# Patient Record
Sex: FEMALE | Race: WHITE | NOT HISPANIC OR LATINO | Employment: UNEMPLOYED | URBAN - METROPOLITAN AREA
[De-identification: names, ages, dates, MRNs, and addresses within clinical notes are randomized per-mention and may not be internally consistent; named-entity substitution may affect disease eponyms.]

---

## 2023-07-19 ENCOUNTER — OFFICE VISIT (OUTPATIENT)
Dept: PRIMARY CARE | Facility: CLINIC | Age: 63
End: 2023-07-19
Payer: COMMERCIAL

## 2023-07-19 VITALS
DIASTOLIC BLOOD PRESSURE: 76 MMHG | OXYGEN SATURATION: 96 % | RESPIRATION RATE: 18 BRPM | TEMPERATURE: 97.9 F | BODY MASS INDEX: 24.8 KG/M2 | HEIGHT: 68 IN | WEIGHT: 163.6 LBS | SYSTOLIC BLOOD PRESSURE: 108 MMHG | HEART RATE: 68 BPM

## 2023-07-19 DIAGNOSIS — M25.442 FINGER JOINT SWELLING, LEFT: Primary | ICD-10-CM

## 2023-07-19 PROCEDURE — 99213 OFFICE O/P EST LOW 20 MIN: CPT | Performed by: FAMILY MEDICINE

## 2023-07-19 PROCEDURE — 1036F TOBACCO NON-USER: CPT | Performed by: FAMILY MEDICINE

## 2023-07-19 RX ORDER — CEPHALEXIN 500 MG/1
500 CAPSULE ORAL 2 TIMES DAILY
Qty: 10 CAPSULE | Refills: 0 | Status: SHIPPED | OUTPATIENT
Start: 2023-07-19 | End: 2023-07-24

## 2023-07-19 NOTE — ASSESSMENT & PLAN NOTE
Mild swelling, suspect more inflammatory condition likely related to overuse  Recommend use of ibuprofen regularly q6hrs for 24-48 hours, can ice or cold soaks  If any worsening erythema then antibiotic sent so pt has available while on vacation  If swelling worsens, progresses, any change in sensation then recommend reevaluation

## 2023-07-19 NOTE — PROGRESS NOTES
"Subjective   Patient ID: Morena Calle is a 63 y.o. female who presents for INFECTED FINGER (LEFT INDEX FINGER, A LITTLE SWOLLEN AND PAINFUL, AND TIGHT TO MOVE. PREV SMALL OPEN WOUND/).    HPI   Noticed some redness and swelling in the second digit at the PIP joint.  I has improved somewhat.  She has been doing a lot with her hands and fingers.  Took some ibuprofen and monitored it.  Seems to have improved.  Only concern is that she is going out of town tomorrow. Minimal discomfort with ROM, no significant pain, numbness.    Review of Systems  Negative unless noted in HPI   Objective   /76 (BP Location: Left arm, Patient Position: Sitting)   Pulse 68   Temp 36.6 °C (97.9 °F) (Oral)   Resp 18   Ht 1.727 m (5' 7.99\")   Wt 74.2 kg (163 lb 9.6 oz)   SpO2 96%   BMI 24.88 kg/m²     Physical Exam  Constitutional:       Appearance: Normal appearance.   Musculoskeletal:      Comments: Left hand, 2nd digit is minimally edematous compared to right, the palmar surfaces there is no significant erythema, no open lesions or cuts noted, normal cap refill, no fluctuance or induration   Neurological:      Mental Status: She is alert.         Assessment/Plan   Problem List Items Addressed This Visit       Finger joint swelling, left - Primary     Mild swelling, suspect more inflammatory condition likely related to overuse  Recommend use of ibuprofen regularly q6hrs for 24-48 hours, can ice or cold soaks  If any worsening erythema then antibiotic sent so pt has available while on vacation  If swelling worsens, progresses, any change in sensation then recommend reevaluation         Relevant Medications    cephalexin (Keflex) 500 mg capsule          "

## 2023-11-06 ENCOUNTER — ANCILLARY PROCEDURE (OUTPATIENT)
Dept: RADIOLOGY | Facility: CLINIC | Age: 63
End: 2023-11-06
Payer: COMMERCIAL

## 2023-11-06 DIAGNOSIS — Z12.31 ENCOUNTER FOR SCREENING MAMMOGRAM FOR MALIGNANT NEOPLASM OF BREAST: ICD-10-CM

## 2023-11-06 PROCEDURE — 77067 SCR MAMMO BI INCL CAD: CPT

## 2023-11-06 PROCEDURE — 77063 BREAST TOMOSYNTHESIS BI: CPT | Performed by: RADIOLOGY

## 2023-11-06 PROCEDURE — 77067 SCR MAMMO BI INCL CAD: CPT | Performed by: RADIOLOGY

## 2023-11-14 ENCOUNTER — OFFICE VISIT (OUTPATIENT)
Dept: PRIMARY CARE | Facility: CLINIC | Age: 63
End: 2023-11-14
Payer: COMMERCIAL

## 2023-11-14 VITALS
OXYGEN SATURATION: 97 % | WEIGHT: 161 LBS | DIASTOLIC BLOOD PRESSURE: 80 MMHG | HEIGHT: 67 IN | HEART RATE: 71 BPM | SYSTOLIC BLOOD PRESSURE: 128 MMHG | BODY MASS INDEX: 25.27 KG/M2

## 2023-11-14 DIAGNOSIS — R82.90 ABNORMAL URINE ODOR: ICD-10-CM

## 2023-11-14 DIAGNOSIS — Z23 FLU VACCINE NEED: ICD-10-CM

## 2023-11-14 DIAGNOSIS — Z00.00 HEALTH MAINTENANCE EXAMINATION: Primary | ICD-10-CM

## 2023-11-14 DIAGNOSIS — R15.9 INCONTINENCE OF FECES, UNSPECIFIED FECAL INCONTINENCE TYPE: ICD-10-CM

## 2023-11-14 LAB
APPEARANCE UR: CLEAR
BILIRUB UR STRIP.AUTO-MCNC: NEGATIVE MG/DL
COLOR UR: YELLOW
GLUCOSE UR STRIP.AUTO-MCNC: NEGATIVE MG/DL
KETONES UR STRIP.AUTO-MCNC: NEGATIVE MG/DL
LEUKOCYTE ESTERASE UR QL STRIP.AUTO: NEGATIVE
NITRITE UR QL STRIP.AUTO: NEGATIVE
PH UR STRIP.AUTO: 7 [PH]
PROT UR STRIP.AUTO-MCNC: NEGATIVE MG/DL
RBC # UR STRIP.AUTO: NEGATIVE /UL
SP GR UR STRIP.AUTO: 1.01
UROBILINOGEN UR STRIP.AUTO-MCNC: <2 MG/DL

## 2023-11-14 PROCEDURE — 90686 IIV4 VACC NO PRSV 0.5 ML IM: CPT | Performed by: FAMILY MEDICINE

## 2023-11-14 PROCEDURE — 99396 PREV VISIT EST AGE 40-64: CPT | Performed by: FAMILY MEDICINE

## 2023-11-14 PROCEDURE — 90471 IMMUNIZATION ADMIN: CPT | Performed by: FAMILY MEDICINE

## 2023-11-14 PROCEDURE — 1036F TOBACCO NON-USER: CPT | Performed by: FAMILY MEDICINE

## 2023-11-14 PROCEDURE — 81003 URINALYSIS AUTO W/O SCOPE: CPT

## 2023-11-14 ASSESSMENT — PAIN SCALES - GENERAL: PAINLEVEL: 0-NO PAIN

## 2023-11-14 NOTE — ASSESSMENT & PLAN NOTE
Discussed potential causes  Recommend trial of physical therapy as this may be the cause of the symptoms, referral placed

## 2023-11-14 NOTE — ASSESSMENT & PLAN NOTE
Unclear cause  Will do UA with microscopy to evaluate further  Can consider urology referral if no obvious cause noted

## 2023-11-14 NOTE — PROGRESS NOTES
Reason for Visit: Annual Physical Exam    HPI:  Presents for wellness  Sees GYN for pap/breast, up to date  Sees GI and up to date with colonoscopy  Did have labs at 's work and TSH was a bit off, would like to recheck  Deals with GI issues, if she does not take metamucil gets some anal leakage and rash, GI did not find any problems, Metamucil does help  Has had abnormal smell to urine for last 2-3 years, no obvious cause, has had testing done for STI and negative, no other cause, wonders if related to GI issues    Active Problem List  Patient Active Problem List   Diagnosis    Finger joint swelling, left    Health maintenance examination    Abnormal urine odor    Incontinence of feces       Comprehensive Medical/Surgical/Social/Family History  Past Medical History:   Diagnosis Date    Diverticulitis of intestine, part unspecified, without perforation or abscess without bleeding 10/16/2014    Diverticulitis    Pain in unspecified ankle and joints of unspecified foot 05/07/2014    Ankle joint pain    Personal history of other diseases of the respiratory system 06/16/2014    History of acute sinusitis    Personal history of other endocrine, nutritional and metabolic disease     History of hypothyroidism    Unspecified abnormal findings in urine 06/01/2021    Abnormal urine odor     Past Surgical History:   Procedure Laterality Date    OTHER SURGICAL HISTORY  02/13/2014    Colposcopy Cervix With Biopsy(S) With Endocervical Curettage     Social History     Tobacco Use    Smoking status: Former     Types: Cigarettes    Smokeless tobacco: Never   Substance Use Topics    Alcohol use: Yes    Drug use: Never     Family History   Problem Relation Name Age of Onset    Breast cancer Paternal Grandmother  80         Allergies and Medications  Adhesive  Current Outpatient Medications on File Prior to Visit   Medication Sig Dispense Refill    psyllium (Metamucil) 3.4 gram packet Take by mouth.       No current  "facility-administered medications on file prior to visit.         ROS otherwise negative aside from what was mentioned above in HPI.    Vitals  /80   Pulse 71   Ht 1.702 m (5' 7\")   Wt 73 kg (161 lb)   SpO2 97%   BMI 25.22 kg/m²   Body mass index is 25.22 kg/m².  Physical Exam  Gen: Alert, NAD  HEENT:  PERRL, EOMI, conjunctiva and sclera normal in appearance.   Neck:  Supple with FROM; No masses/nodes palpable; Thyroid nontender and without nodules; No JERAD  Respiratory:  Lungs CTAB  Cardiovascular:  Heart RRR. No M/R/G. Peripheral pulses equal bilaterally  Abdomen:  Soft, nontender,  No R/G/R; No HSM or masses palpated  Extremities:  FROM all extremities; Muscle strength grossly normal with good tone  Neuro:  CN II-XII intact; Gross motor and sensory intact  Skin:  No suspicious lesions present    Assessment and Plan:  Problem List Items Addressed This Visit       Health maintenance examination - Primary    Current Assessment & Plan     Recommended screening guidelines addressed and orders placed as indicated by age and chronic conditions  Screening labs ordered, will call with results  Continue to work on healthy lifestyle including well balanced diet, regular activity, limit alcohol, no tobacco products and safe sexual practices  Follow up annually           Relevant Orders    Comprehensive Metabolic Panel    Lipid Panel    TSH with reflex to Free T4 if abnormal    Abnormal urine odor    Current Assessment & Plan     Unclear cause  Will do UA with microscopy to evaluate further  Can consider urology referral if no obvious cause noted         Relevant Orders    Urinalysis with Reflex Microscopic    Incontinence of feces    Current Assessment & Plan     Discussed potential causes  Recommend trial of physical therapy as this may be the cause of the symptoms, referral placed         Relevant Orders    Referral to Physical Therapy     Other Visit Diagnoses       Flu vaccine need        Relevant Orders    Flu " vaccine (IIV4) age 6 months and greater, preservative free (Completed)              Ana Gonzales MD

## 2024-01-19 ENCOUNTER — APPOINTMENT (OUTPATIENT)
Dept: OBSTETRICS AND GYNECOLOGY | Facility: CLINIC | Age: 64
End: 2024-01-19
Payer: COMMERCIAL

## 2024-01-25 ENCOUNTER — TREATMENT (OUTPATIENT)
Dept: PHYSICAL THERAPY | Facility: CLINIC | Age: 64
End: 2024-01-25
Payer: COMMERCIAL

## 2024-01-25 DIAGNOSIS — R15.0 INCOMPLETE DEFECATION: Primary | ICD-10-CM

## 2024-01-25 DIAGNOSIS — N39.3 STRESS INCONTINENCE: ICD-10-CM

## 2024-01-25 PROCEDURE — 97535 SELF CARE MNGMENT TRAINING: CPT | Mod: GP | Performed by: PHYSICAL THERAPIST

## 2024-01-25 PROCEDURE — 97161 PT EVAL LOW COMPLEX 20 MIN: CPT | Mod: GP | Performed by: PHYSICAL THERAPIST

## 2024-01-25 ASSESSMENT — ENCOUNTER SYMPTOMS
LOSS OF SENSATION IN FEET: 0
OCCASIONAL FEELINGS OF UNSTEADINESS: 0
DEPRESSION: 0

## 2024-01-25 NOTE — PROGRESS NOTES
Physical Therapy    PELVIC FLOOR EVALUATION AND TREATMENT    Name: Morena Calle  MRN: [id number]  : 1960  Today's Date: 24     Time Calculation  Start Time: 800  Stop Time: 905  Time Calculation (min): 65 min  Visit: 1    Assessment:   Pt presents to clinic with chief complaint of fecal smearing/incomplete emptying and rare stress incontinence. Pt states symptoms have been ongoing for the last couple of years and have gotten progressively worse.   Provided pt with flexibility exercises as well as reviewed and instructed pt on proper voiding technique and breath work to help with complete emptying   Explained to pt that this PT is not trained to perform specific rectal examination; provided pt with other appropriate providers who are trained to assess this specific area. Will have pt follow up with one of these PFPTs within the  system         Plan:   Treatment/Interventions: Cryotherapy, Education/ Instruction, Electrical stimulation, Hot pack, Manual therapy, Neuromuscular re-education, Self care/ home management, Therapeutic activities, Therapeutic exercises  PT Plan: Skilled PT  PT Frequency: 1 time per week  Duration: 6-8 weeks  Rehab Potential: Good  Plan of Care Agreement: Patient      Current Problem:  1. Incomplete defecation        2. Stress incontinence            Subjective   General  Reason for Referral: Incomplete emptying, fecal smearing, stress UI  Referred By: Dr. Ana Gonzales  Preferred Learning Style: visual, written  Precautions  STEADI Fall Risk Score (The score of 4 or more indicates an increased risk of falling): 0  Precautions Comment: None    Objective   PELVIC HISTORY:    Chief Complaint/Description of Symptoms:   Pt presents to clinic with chief complaint of incomplete emptying, fecal smearing after having BM; potential leakage       HPI:   Pt states problem has been ongoing for the last couple of years  Unsure if she is actually having leakage or has difficulty with  full emptying; pt states she will have a BM, wipe until clean and then when she returns to go void (to urinate), when she wipes she will still note smearing when she wipes   BM tend to be inconsistent in frequency and consistency as well; the last couple of weeks have been good, however, typically she has looser stools     Home Environment/Social Factors/Occupation:     Vaginal births with both of her children   No significant tearing or difficulty healing after births    PELVIC PAIN:     Pt reports intermittent lower abdominal/stomach pain; pt does feel like it could be related to IBS    Since onset, the symptoms are: worsening; some days are better than others   Pain when emptying bladder: No  Pain with wiping or tight clothing: irritation vs pain   Pain with intercourse: Not specifically   History of back pain: No    EXERCISE:  Do you do Kegels? No   Current exercise regime: NA    BLADDER:     Excessive Urinary Urgency: No  Daytime Voiding Frequency: WNL  Nighttime Voiding Frequency: Occasionally will wake up in the middle of the nigh to void   Unintentional urine loss frequency: Rarely  Leakage occurs with: sneezing, coughing  Leakage amount: Small   Difficulty initiating flow of urine: No  Slow/weak urine stream: No  Difficulty starting urine stream/push to urinate: No  Able to completely empty bladder: Yes  Tests performed by doctor: No  Frequent UTI's: No  Frequent yeast infections: No     BOWEL:     Excessive Bowel Urgency: No  BM Frequency: Daily  Frequent Diarrhea: Yes  Frequent constipation/straining/incomplete emptying: Not specifically   Lyman Stool Scale rating: Type 4 lately; previous to that was more Type 5-6    Unintentional stool loss: Don't necessarily see anything; will note smearing with wiping and irritation  Stool loss frequency: Daily; incomplete emptying (?)  FI occurs with what activity: N/A  FI amount: Small amount; if any   Consistency of stool when FI occurs: not specific; again  will more notice smearing vs full leakage    Tests performed by doctor: No specific testing besides regular colonoscopies       POSTURE:   Posture WNL   Rounded shoulder posturing  Mild winging R scapula       ROM R/L:  Lumbar flex: 90  Lumbar ext: 10  Lumbar side bend: 25/25 no pain  Lumbar rotation: 100%/100%  Hip flex: WNL/WNL  Hip IR: Mild limitation/mild limitation  Hip ER: WNL/WNL    STRENGTH  R/L     Hip flexors: 5/5   Hip abd: 5/5   Hip add: 5/5   Hip ER: 4+/4+   Hip IR: 5/5     Quadriceps: 5/5   Hamstrings: 4+/4+      OUTCOMES MEASURE:  Female NIH Chronic Prostatitis Symptom Index: 15    Education:  PFPT  Anatomy of PF   Potential causes of current symptoms    TREATMENT  Therapeutic exercise:  Happy baby  Child's pose with pelvic floor relaxation    Self-care  Colonic massage review and instruction  Correct voiding posturing review and instruction along with breathwork to help with full emptying     Careplan Goals:  Problem: PT Problem       Goal: Pt will be independent in HEP for home maintenance            Pradip De La Cruz, PT

## 2024-02-22 ENCOUNTER — OFFICE VISIT (OUTPATIENT)
Dept: OBSTETRICS AND GYNECOLOGY | Facility: CLINIC | Age: 64
End: 2024-02-22
Payer: COMMERCIAL

## 2024-02-22 VITALS — WEIGHT: 162 LBS | HEIGHT: 67 IN | BODY MASS INDEX: 25.43 KG/M2

## 2024-02-22 DIAGNOSIS — Z01.419 WELL WOMAN EXAM: Primary | ICD-10-CM

## 2024-02-22 DIAGNOSIS — Z12.4 CERVICAL CANCER SCREENING: ICD-10-CM

## 2024-02-22 PROCEDURE — 1036F TOBACCO NON-USER: CPT | Performed by: OBSTETRICS & GYNECOLOGY

## 2024-02-22 PROCEDURE — 88175 CYTOPATH C/V AUTO FLUID REDO: CPT

## 2024-02-22 PROCEDURE — 87624 HPV HI-RISK TYP POOLED RSLT: CPT

## 2024-02-22 PROCEDURE — 99396 PREV VISIT EST AGE 40-64: CPT | Performed by: OBSTETRICS & GYNECOLOGY

## 2024-02-22 ASSESSMENT — ENCOUNTER SYMPTOMS
FEVER: 0
CONSTIPATION: 0
VOMITING: 0
BACK PAIN: 0
DYSURIA: 0
ABDOMINAL DISTENTION: 0
SHORTNESS OF BREATH: 0
FATIGUE: 0
COLOR CHANGE: 0
SLEEP DISTURBANCE: 0
FREQUENCY: 0
UNEXPECTED WEIGHT CHANGE: 0
ABDOMINAL PAIN: 0
DIARRHEA: 0
BLOOD IN STOOL: 0
FLANK PAIN: 0
CHILLS: 0
APPETITE CHANGE: 0
HEMATURIA: 0
NAUSEA: 0

## 2024-02-22 ASSESSMENT — PAIN SCALES - GENERAL: PAINLEVEL: 0-NO PAIN

## 2024-02-22 NOTE — PROGRESS NOTES
"History Of Present Illness  Routine Gyn Exam  Morena Calle here for routine WWE.  Pt is postmenopausal.  Denies spotting or bleeding.     Concerns: none.   Pt has long history of issues with bowel movements and has some urinary complaints. Has complained of urinary odor for several years with no findings on routine testing.  She has appointment with Urogyn next month to follow up on these issues.     New health issues or surgeries since last visit: denies.  Exercise: none.     Gynecologic History  Postmenopausal.  Sexually active: Yes with one male partner/ .  Last Pap: .   Last mammogram: .   Last Colonoscopy:  up to date per patient .       Obstetric History  OB History    Para Term  AB Living   4 2 2   2 2   SAB IAB Ectopic Multiple Live Births           1      # Outcome Date GA Lbr Jef/2nd Weight Sex Delivery Anes PTL Lv   4 Term     M Vag-Spont   CATHY   3 Term     F Vag-Spont      2 AB            1 AB                 Review of Systems   Constitutional:  Negative for appetite change, chills, fatigue, fever and unexpected weight change.   Respiratory:  Negative for shortness of breath.    Cardiovascular:  Negative for chest pain.   Gastrointestinal:  Negative for abdominal distention, abdominal pain, blood in stool, constipation, diarrhea, nausea and vomiting.   Endocrine: Negative for cold intolerance and heat intolerance.   Genitourinary:  Negative for dyspareunia, dysuria, flank pain, frequency, genital sores, hematuria, menstrual problem, pelvic pain, urgency, vaginal bleeding, vaginal discharge and vaginal pain.   Musculoskeletal:  Negative for back pain.   Skin:  Negative for color change.   Psychiatric/Behavioral:  Negative for sleep disturbance.        Ht 1.702 m (5' 7\")   Wt 73.5 kg (162 lb)   BMI 25.37 kg/m²      Physical Exam  Constitutional:       Appearance: Normal appearance.   HENT:      Head: Normocephalic and atraumatic.   Chest:   Breasts:     " Right: Normal.      Left: Normal.   Abdominal:      General: Abdomen is flat.      Palpations: Abdomen is soft.      Tenderness: There is no abdominal tenderness.   Genitourinary:     General: Normal vulva.      Vagina: Normal.      Cervix: Normal.      Uterus: Normal.       Adnexa: Right adnexa normal and left adnexa normal.      Comments: Pap collected today    Skin:     General: Skin is warm and dry.   Neurological:      Mental Status: She is alert and oriented to person, place, and time.   Psychiatric:         Mood and Affect: Mood normal.              Assessment/Plan         Routine Well Woman Exam Today  Discussed diet and exercise.   Reviewed routine health screenings.   Pap: pap done today   Recommend annual mammograms. Last mammogram 11/2023.  Currently up to date on colon cancer screening.     Pt to follow up with Urogyn for some pelvic floor/urinary issues.                Jaylin Acosta MD

## 2024-03-01 ENCOUNTER — OFFICE VISIT (OUTPATIENT)
Dept: OBSTETRICS AND GYNECOLOGY | Facility: CLINIC | Age: 64
End: 2024-03-01
Payer: COMMERCIAL

## 2024-03-01 VITALS
DIASTOLIC BLOOD PRESSURE: 77 MMHG | BODY MASS INDEX: 24.71 KG/M2 | SYSTOLIC BLOOD PRESSURE: 133 MMHG | HEIGHT: 68 IN | HEART RATE: 79 BPM | WEIGHT: 163 LBS

## 2024-03-01 DIAGNOSIS — R82.90 ABNORMAL URINE ODOR: Primary | ICD-10-CM

## 2024-03-01 DIAGNOSIS — R15.9 INCONTINENCE OF FECES, UNSPECIFIED FECAL INCONTINENCE TYPE: ICD-10-CM

## 2024-03-01 DIAGNOSIS — N95.2 VAGINAL ATROPHY: ICD-10-CM

## 2024-03-01 DIAGNOSIS — K64.9 HEMORRHOIDS, UNSPECIFIED HEMORRHOID TYPE: ICD-10-CM

## 2024-03-01 LAB
POC APPEARANCE, URINE: CLEAR
POC BILIRUBIN, URINE: NEGATIVE
POC BLOOD, URINE: NEGATIVE
POC COLOR, URINE: YELLOW
POC GLUCOSE, URINE: NEGATIVE MG/DL
POC KETONES, URINE: NEGATIVE MG/DL
POC LEUKOCYTES, URINE: NEGATIVE
POC NITRITE,URINE: NEGATIVE
POC PH, URINE: 6 PH
POC PROTEIN, URINE: NEGATIVE MG/DL
POC SPECIFIC GRAVITY, URINE: 1.01
POC UROBILINOGEN, URINE: 0.2 EU/DL

## 2024-03-01 PROCEDURE — 1036F TOBACCO NON-USER: CPT | Performed by: OBSTETRICS & GYNECOLOGY

## 2024-03-01 PROCEDURE — 99214 OFFICE O/P EST MOD 30 MIN: CPT | Performed by: OBSTETRICS & GYNECOLOGY

## 2024-03-01 PROCEDURE — 81003 URINALYSIS AUTO W/O SCOPE: CPT | Mod: 91 | Performed by: OBSTETRICS & GYNECOLOGY

## 2024-03-01 ASSESSMENT — ENCOUNTER SYMPTOMS
CHILLS: 0
COUGH: 0
LIGHT-HEADEDNESS: 0
CONSTIPATION: 0
NERVOUS/ANXIOUS: 0
HEMATURIA: 0
WEAKNESS: 0
FREQUENCY: 0
SHORTNESS OF BREATH: 0
DIARRHEA: 0
FATIGUE: 0
DIZZINESS: 0
ABDOMINAL PAIN: 0
FEVER: 0
DYSURIA: 0

## 2024-03-01 ASSESSMENT — PATIENT HEALTH QUESTIONNAIRE - PHQ9
2. FEELING DOWN, DEPRESSED OR HOPELESS: NOT AT ALL
SUM OF ALL RESPONSES TO PHQ9 QUESTIONS 1 AND 2: 0
1. LITTLE INTEREST OR PLEASURE IN DOING THINGS: NOT AT ALL

## 2024-03-01 ASSESSMENT — PAIN SCALES - GENERAL: PAINLEVEL: 0-NO PAIN

## 2024-03-01 NOTE — PROGRESS NOTES
Referred by: Dr. Acosta     PCP  Elayne Chua MD         CHIEF COMPLAINT:  urine odor, ?prolapse         HISTORY OF PRESENT ILLNESS:  This is a 63 y.o. female,  who presents with foul smelling urine for several years. No hematuria or UTIs. She also reports some extra tissue near her rectum. She isn't sure if this is prolapse or a hemorrhoid, but it bothers her.         Specifically, she describes the following pelvic floor symptoms:          Prolapse: No       - Splinting to urinate: No       - Splinting for bowel movement/stool trapping: No              Incontinence:  Yes             Stress - rarely without cough, laugh, sneeze              Urinary Symptoms:       - Frequency:  No             # Voids: 4-5x       - Nocturia: No             # Voids: 0x       - Urgency:  No       - Incomplete emptying:  No       - Hesitancy:  No       - Pain with voiding:  No       - Postvoid dribbling:  No               History:       - Recurrent UTI:  No       - Hematuria:  No       - Stones:  No       - Kidney Disease:  No                  Bowel Symptoms:       - Regular: Yes       - Diarrhea:Yes - chronic, sees GI, was taking fiber but stopped because it wasn't making a difference       - Constipation: No       - Fecal Incontinence: Yes - notices stool remaining even after she wipes       - Flatus Incontinence:  No       - Fecal urgency:   Yes        Gyn History:  - Menopausal: Yes           Postmenopausal bleeding: No  - HRT: No  - Hysterectomy: No  - Pap up to date: Yes - collected 24 and in process   History of abnormal pap: Yes - in the , had a colposcopy, has been normal since  - Sexually active:  Yes  Dyspareunia: No   Other issues: no  - Number of prior vaginal deliveries: 2   Number of prior operative deliveries no   Prior OASI? no  - Number of prior c-sections: 0    - Mammogram up to date: Yes  - Colonoscopy up to date: Yes    OB History          4    Para   2    Term   2            AB  "  2    Living   2         SAB        IAB        Ectopic        Multiple        Live Births   1                 Past Medical History:   Diagnosis Date    Diverticulitis of intestine, part unspecified, without perforation or abscess without bleeding 10/16/2014    Diverticulitis    Pain in unspecified ankle and joints of unspecified foot 05/07/2014    Ankle joint pain    Personal history of other diseases of the respiratory system 06/16/2014    History of acute sinusitis    Personal history of other endocrine, nutritional and metabolic disease     History of hypothyroidism    Unspecified abnormal findings in urine 06/01/2021    Abnormal urine odor       Past Surgical History:   Procedure Laterality Date    OTHER SURGICAL HISTORY  02/13/2014    Colposcopy Cervix With Biopsy(S) With Endocervical Curettage       Family History   Problem Relation Name Age of Onset    Breast cancer Paternal Grandmother  80       Review of Systems   Constitutional:  Negative for chills, fatigue and fever.   Respiratory:  Negative for cough and shortness of breath.    Cardiovascular:  Negative for chest pain.   Gastrointestinal:  Negative for abdominal pain, constipation and diarrhea.   Genitourinary:  Negative for dyspareunia, dysuria, frequency, hematuria, pelvic pain and urgency.   Neurological:  Negative for dizziness, weakness and light-headedness.   Psychiatric/Behavioral:  The patient is not nervous/anxious.              PHYSICAL EXAMINATION:  No LMP recorded. Patient is postmenopausal.  Body mass index is 25.15 kg/m².  Visit Vitals  /77   Pulse 79   Ht 1.715 m (5' 7.5\")   Wt 73.9 kg (163 lb)   BMI 25.15 kg/m²   OB Status Postmenopausal   Smoking Status Former   BSA 1.88 m²     General Appearance: well appearing  Neuro: Alert and oriented     Pelvic:  Genitourinary: normal external genitalia, Bartholin's glands negative, Kent's glands negative, normal sensation, bulbocavernosus and anal wink reflex absent  Urethra:   normal " meatus, non-tender, no periurethral mass  Vagina: mucosa normal  Cervix: normal  Uterus: normal size, nontender  Adnexae: negative nontender, no masses  Atrophy: negative    CST negative  Pelvic floor muscle contraction  4/5    POP-Q (in supine position):       Aa 0     Ba 0     C -6              gh 4     pb 5     tvl 9              Ap -2     Bp -2     D -8    Rectal: concentric squeeze, moderate puborectalis lift  Hemorrhoid noted at 12 o'clock    PVR (by Ultrasound): 79 mL   Urine dip:   Recent Results (from the past 6 hour(s))   POCT UA Automated manually resulted    Collection Time: 24  2:43 PM   Result Value Ref Range    POC Color, Urine Yellow Straw, Yellow, Light-Yellow    POC Appearance, Urine Clear Clear    POC Glucose, Urine NEGATIVE NEGATIVE mg/dl    POC Bilirubin, Urine NEGATIVE NEGATIVE    POC Ketones, Urine NEGATIVE NEGATIVE mg/dl    POC Specific Gravity, Urine 1.010 1.005 - 1.035    POC Blood, Urine NEGATIVE NEGATIVE    POC PH, Urine 6.0 No Reference Range Established PH    POC Protein, Urine NEGATIVE NEGATIVE, 30 (1+) mg/dl    POC Urobilinogen, Urine 0.2 0.2, 1.0 EU/DL    Poc Nitrite, Urine NEGATIVE NEGATIVE    POC Leukocytes, Urine NEGATIVE NEGATIVE         IMPRESSION AND PLAN:  Morena Alva is a 63 y.o.  who presents with urine odor    Problem List Items Addressed This Visit       Abnormal urine odor - Primary    Incontinence of feces     Other Visit Diagnoses       Vaginal atrophy        Relevant Orders    Measure post void residual (Completed)    POCT UA Automated manually resulted (Completed)    Hemorrhoids, unspecified hemorrhoid type        Relevant Orders    Referral to Colorectal Surgery           Odor of urine  - negative Ucx over the past few years, no symptoms of UTI  - reassurance provided    Fecal smearing  Chronic diarrhea  - follows with GI  - was previously taking fiber  - went to PFPT one time, but didn't notice improvement  - discussed changing to Benefiber  and returning to PFPT    Hemorrhoid  - referral to colorectal surgery    Follow up in 4 months    All questions and concerns were answered and addressed.  The patient expressed understanding and agrees with the plan.     Patient seen with Dr. Heriberto Johnson MD  Urogynecology and Reconstructive Pelvic Surgery Fellow  3/1/2024 2:47 PM     I saw and evaluated the patient. I personally obtained the key and critical portions of the history and physical exam or was physically present for key and critical portions performed by the resident/fellow. I reviewed the resident/fellow's documentation and discussed the patient with the resident/fellow. I agree with the resident/fellow's medical decision making as documented in the note.    Emperatriz Louis MD MPH

## 2024-03-04 NOTE — PATIENT INSTRUCTIONS
We discussed lifestyle changes includin) Aiming to drink around 60 oz total of fluids per day   2) Avoiding bladder irritants such as caffeine, nicotine, artificial sweeteners   3) Stop drinking fluids 3 hours before bedtime   4) Timed voids every 2-3 hours.       Fiber Therapy:    Fiber acts in the colon (large bowel) to make the stool soft.  If the stool is too hard, the fiber draws water in and makes it softer.  If the stool is too watery, it acts like a 'sponge' and soaks up the liquid.     Many foods contain fiber. Fruits, vegetables, whole grains, and high fiber cereals all contain some fiber. Unfortunately, most of us don’t eat enough and a fiber supplement is a good way to increase soluble fiber intake.     Supplemental fiber comes in many forms. You could choose from:    1.   Powder  2.   Tablets  3.   Wafers/Cookies/Gummies    Some common brands include:    1.   Benefiber (tasteless powder)  2.   Metamucil (powder)  3.   Konsyl (powder)  4.   Citrucel (powder, may cause less gas)  5.   Fiber-Con (tablet)    All of these products work in the same way, but some may work better than others for you.    Dosin.   One tablespoon of powder dissolves in 8-16 oz of water or juice OR  2.   Two tablets with 8-16 oz water or juice OR  3.   Two fiber wafers/cookies with 8-16 oz of water or juice    Take fiber in the morning. Start off using it once per day. You can then increase frequency if needed.    IF FIBER IS NOT TAKEN WITH ADEQUATE WATER/FLUID INTAKE, IT MAY BE CONSTIPATING.  DRINK 6-8 GLASSES OF WATER/LIQUIDS THROUGHOUT THE DAY WHILE TAKING FIBER SUPPLEMENTATION.    Fiber can cause gas/bloating, especially when first starting the treatment.  If this is the case, you can take simethicone (Gas-X) over the counter after meals and at bedtime as needed.    Summary:  1.  Remember: the most common cause of constipation is inadequate water intake during the day. Avoiding dehydration is usually the key to  success with fiber supplementation.  2. Using fiber requires some experimentation- if one brand doesn’t work or causes excessive gas, take another. Also, try varying the form of fiber- for example, if the powder is unpalatable; take the tablets or wafers instead.  3. You may need more than one dose per day- feel free to increase your dose to morning and mid-day if that works better.  4. Results depend on consistency of usage- the more consistent you are in taking fiber, the better the results!

## 2024-03-08 ENCOUNTER — TELEPHONE (OUTPATIENT)
Dept: OBSTETRICS AND GYNECOLOGY | Facility: CLINIC | Age: 64
End: 2024-03-08
Payer: COMMERCIAL

## 2024-03-08 LAB
CYTOLOGY CMNT CVX/VAG CYTO-IMP: NORMAL
HPV HR 12 DNA GENITAL QL NAA+PROBE: NEGATIVE
HPV HR GENOTYPES PNL CVX NAA+PROBE: NEGATIVE
HPV16 DNA SPEC QL NAA+PROBE: NEGATIVE
HPV18 DNA SPEC QL NAA+PROBE: NEGATIVE
LAB AP HPV GENOTYPE QUESTION: YES
LAB AP HPV HR: NORMAL
LABORATORY COMMENT REPORT: NORMAL
PATH REPORT.TOTAL CANCER: NORMAL

## 2024-03-08 NOTE — TELEPHONE ENCOUNTER
Patient called wanting results from her Pap test. She says it hasnt populated in [employer].

## 2024-03-12 ENCOUNTER — OFFICE VISIT (OUTPATIENT)
Dept: SURGERY | Facility: CLINIC | Age: 64
End: 2024-03-12
Payer: COMMERCIAL

## 2024-03-12 VITALS
HEIGHT: 67 IN | SYSTOLIC BLOOD PRESSURE: 148 MMHG | WEIGHT: 163 LBS | DIASTOLIC BLOOD PRESSURE: 91 MMHG | BODY MASS INDEX: 25.58 KG/M2 | TEMPERATURE: 97.2 F | HEART RATE: 73 BPM

## 2024-03-12 DIAGNOSIS — R15.1 FECAL SOILING: Primary | ICD-10-CM

## 2024-03-12 DIAGNOSIS — K64.9 HEMORRHOIDS, UNSPECIFIED HEMORRHOID TYPE: ICD-10-CM

## 2024-03-12 DIAGNOSIS — N81.89 PELVIC FLOOR WEAKNESS IN FEMALE: ICD-10-CM

## 2024-03-12 PROCEDURE — 99213 OFFICE O/P EST LOW 20 MIN: CPT | Performed by: NURSE PRACTITIONER

## 2024-03-12 PROCEDURE — 46600 DIAGNOSTIC ANOSCOPY SPX: CPT | Performed by: NURSE PRACTITIONER

## 2024-03-12 PROCEDURE — 99203 OFFICE O/P NEW LOW 30 MIN: CPT | Performed by: NURSE PRACTITIONER

## 2024-03-12 PROCEDURE — 1036F TOBACCO NON-USER: CPT | Performed by: NURSE PRACTITIONER

## 2024-03-12 NOTE — PROGRESS NOTES
Subjective   Patient ID: Morena Calle is a 63 y.o. female who presents today with fecal soiling.    HPI  colonoscopy 2023 negative.    She has had the anal skin tag for years.  It does not bother her or cause any issues.    She has been having a little leakage of stool over the past few years.  She will have the leakage for a few days and then nothing for days.  No c/o any full accidents of stool.  She will have a little burning or itching to the perineum at times.  She will have 1 soft-loose BM's every day.  She was taking Metamucil but has since stopped because it was not helping with the stools.  She does not feel any prolapsing hemorrhoids come down after her BM's.      She has had 2 vaginal births with 2 episiotomies.  NO hx of any perianal surgeries.    She went to 1 visit for pelvic floor therapy.    Review of Systems   All other systems reviewed and are negative.      Objective   Physical Exam  Constitutional:       Appearance: Normal appearance.   HENT:      Head: Normocephalic and atraumatic.   Pulmonary:      Effort: Pulmonary effort is normal.   Musculoskeletal:         General: Normal range of motion.   Skin:     General: Skin is warm and dry.   Neurological:      General: No focal deficit present.      Mental Status: She is alert and oriented to person, place, and time.   Psychiatric:         Mood and Affect: Mood normal.         Behavior: Behavior normal.         Anoscopy    Date/Time: 3/12/2024 9:32 AM    Performed by: JAVIER Souza  Authorized by: JAVIER Souza    Consent:     Consent obtained:  Verbal    Consent given by:  Patient    Risks, benefits, and alternatives were discussed: yes    Universal protocol:     Procedure explained and questions answered to patient or proxy's satisfaction: yes      Patient identity confirmed:  Verbally with patient  Post-procedure details:     Procedure completion:  Tolerated  Comments:      She has small anal skin tags with no  irritation.  Weak tone on ADRIENNE, no pain.  On anoscopy, no irritation or prolapsing internal hemorrhoids.  No active bleeding.  No pain    Assessment/Plan   Morena has fecal soiling and will restart going to Biofeedback to help strengthen her anus and pelvic floor.  She will restart taking Metamucil 1-2x/day to help bulk up her stools.  She will call with any issues and will follow up after Biofeedback if she is still having the soiling.         DAVID Souza-CNP 03/12/24 9:09 AM

## 2024-04-05 ENCOUNTER — TREATMENT (OUTPATIENT)
Dept: PHYSICAL THERAPY | Facility: CLINIC | Age: 64
End: 2024-04-05
Payer: COMMERCIAL

## 2024-04-05 DIAGNOSIS — R15.0 INCOMPLETE DEFECATION: ICD-10-CM

## 2024-04-05 DIAGNOSIS — N39.3 STRESS INCONTINENCE: Primary | ICD-10-CM

## 2024-04-05 PROCEDURE — 97164 PT RE-EVAL EST PLAN CARE: CPT | Mod: 59,GP | Performed by: PHYSICAL THERAPIST

## 2024-04-05 PROCEDURE — 97110 THERAPEUTIC EXERCISES: CPT | Mod: GP | Performed by: PHYSICAL THERAPIST

## 2024-04-05 PROCEDURE — 97535 SELF CARE MNGMENT TRAINING: CPT | Mod: GP | Performed by: PHYSICAL THERAPIST

## 2024-04-05 NOTE — PROGRESS NOTES
Physical Therapy    PELVIC FLOOR EVALUATION AND TREATMENT    Name: Morena Calle  MRN: [id number]  : 1960  Today's Date: 24     Time Calculation  Start Time: 1240  Stop Time: 1330  Time Calculation (min): 50 min  Visit: 2    Assessment:   Pt returning after a few months. Has been working on exercises provided at last session. Symptoms are the same.   Completed internal examination today with informed pt consent. Pt does demonstrate increased tension at pelvic floor; no associated pain with palpation. Incomplete relaxation following Kegel contraction. Educated pt that increased tension could be cause of incomplete emptying with BM  At this time, pt would like to follow up with PT who is specialized in bowel treatment and can perform more specific examination  Provided pt with resources to transfer care. Additionally, provided pt with HEP flexibility work to build off of last session         Plan:   Pt will call to follow up with PT who specializes specifically in bowel treatment      Current Problem:  1. Stress incontinence        2. Incomplete defecation            Subjective   Pt overall is feeling the same since the last time she was in clinic  Continues to have incomplete emptying and continued fecal smearing after having BM     Objective   PELVIC HISTORY:    Chief Complaint/Description of Symptoms:   Pt presents to clinic with chief complaint of incomplete emptying, fecal smearing after having BM      HPI:   Pt states problem has been ongoing for the last couple of years  Unsure if she is actually having leakage or has difficulty with full emptying; pt states she will have a BM, wipe until clean and then when she returns to go void (to urinate), when she wipes she will still note smearing when she wipes   BM tend to be inconsistent in frequency and consistency as well; the last couple of weeks have been good, however, typically she has looser stools     Home Environment/Social  Factors/Occupation:     Vaginal births with both of her children   No significant tearing or difficulty healing after births    PELVIC PAIN:     Pt reports intermittent lower abdominal/stomach pain; pt does feel like it could be related to IBS    Since onset, the symptoms are: worsening; some days are better than others   Pain when emptying bladder: No  Pain with wiping or tight clothing: irritation vs pain   Pain with intercourse: Not specifically   History of back pain: No    EXERCISE:  Do you do Kegels? No   Current exercise regime: NA    BLADDER:     Excessive Urinary Urgency: No  Daytime Voiding Frequency: WNL  Nighttime Voiding Frequency: Occasionally will wake up in the middle of the nigh to void   Unintentional urine loss frequency: Rarely  Leakage occurs with: sneezing, coughing  Leakage amount: Small   Difficulty initiating flow of urine: No  Slow/weak urine stream: No  Difficulty starting urine stream/push to urinate: No  Able to completely empty bladder: Yes  Tests performed by doctor: No  Frequent UTI's: No  Frequent yeast infections: No     BOWEL:     Excessive Bowel Urgency: No  BM Frequency: Daily  Frequent Diarrhea: Yes  Frequent constipation/straining/incomplete emptying: Not specifically   [place] Stool Scale rating: Type 4 lately; previous to that was more Type 5-6    Unintentional stool loss: Don't necessarily see anything; will note smearing with wiping and irritation  Stool loss frequency: Daily; incomplete emptying (?)  FI occurs with what activity: N/A  FI amount: Small amount; if any   Consistency of stool when FI occurs: not specific; again will more notice smearing vs full leakage    Tests performed by doctor: No specific testing besides regular colonoscopies       POSTURE:   Posture WNL   Rounded shoulder posturing  Mild winging R scapula       ROM R/L:  Lumbar flex: 90  Lumbar ext: 10  Lumbar side bend: 25/25 no pain  Lumbar rotation: 100%/100%  Hip flex: WNL/WNL  Hip IR: Mild  limitation/mild limitation  Hip ER: WNL/WNL    STRENGTH  R/L     Hip flexors: 5/5   Hip abd: 5/5   Hip add: 5/5   Hip ER: 4+/4+   Hip IR: 5/5     Quadriceps: 5/5   Hamstrings: 4+/4+      EXTERNAL OBSERVATION:  Voluntary Contraction: Present   Voluntary Relaxation: incomplete  Involuntary Contraction: present  Involuntary Relaxation incomplete         INTERNAL VAGINAL EXAMINATION:  PFM Strength: 2/5  Coordination: 5 in 10 seconds   Endurance: 5 second holds x 2 repetition      INTERNAL PALPATION:   No pain with internal palpation per pt   Tension noted at bulbocavernosus, ischiocavernosus, transverse perineum bilat  Tension noted at levator ani group bilat      EXTERNAL PALPATION:  Levator Ani: Mild Pain/Tightness R, Mild Pain/Tightness L  Bulbo: No Pain/Tightness R, NO Pain/Tightness L  Ischiocavernosus: No Pain/Tightness R, No Pain/Tightness L  Transverse perineum: No Pain/Tightness R, No Pain/Tightness L      OUTCOMES MEASURE:  Female NIH Chronic Prostatitis Symptom Index: 15      TREATMENT  Therapeutic exercise:  Happy baby  Child's pose with pelvic floor relaxation  Figure 4  Butterfly  Cat cow       Careplan Goals:  Problem: PT Problem       Goal: Pt will be independent in [facility] for home maintenance            Pradip De La Cruz, PT

## 2024-05-10 ENCOUNTER — APPOINTMENT (OUTPATIENT)
Dept: PHYSICAL THERAPY | Facility: CLINIC | Age: 64
End: 2024-05-10
Payer: COMMERCIAL

## 2024-05-29 ENCOUNTER — TREATMENT (OUTPATIENT)
Dept: PHYSICAL THERAPY | Facility: CLINIC | Age: 64
End: 2024-05-29
Payer: COMMERCIAL

## 2024-05-29 DIAGNOSIS — K59.9 BOWEL DYSFUNCTION: Primary | ICD-10-CM

## 2024-05-29 PROCEDURE — 97535 SELF CARE MNGMENT TRAINING: CPT | Mod: GP

## 2024-05-29 NOTE — PROGRESS NOTES
Physical Therapy    PELVIC FLOOR RECHECK    Name: Morena Calle  MRN: [id number]  : 1960  Today's Date: 24     Time Calculation  Start Time: 845  Stop Time: 925  Time Calculation (min): 40 min  Visit: 3    Assessment:     At this point, anticipate fecal smearing from hygiene regarding anterior skin tag. Her EAS appears to be functioning correctly but struggles with tonic contraction.     Plan:   If symptoms are not abated with EAS contraction post bowel movement and vania bottle, then will likely have patient start on biofeedback.       Current Problem:  1. Bowel dysfunction  Follow Up In Physical Therapy    Follow Up In Physical Therapy            Subjective   Pt is a transfer from Pradip De La Cruz, PT DPT at the [facility].    She reports intermittent fecal soiling and leakage. This will not stain her underwear but she notices it when she wipes later. This happens 50% of the time. She has typical loose stools, 1-2 per day. She has decreased emptying.  She has no urgency and no total bowel loss. She is able to hold gas and feels appropriate sensation when needing to have gas/bowel movement.  She does not wear a liner. When she has to have a bowel movement, she will put her feet on a tub.  There is a small skin tag.   She has tried metamucil in the past which helped in the past.    Hx of 2 episiotomies.  Objective   PELVIC HISTORY:    Chief Complaint/Description of Symptoms:   Pt presents to clinic with chief complaint of incomplete emptying, fecal smearing after having BM    Home Environment/Social Factors/Occupation:     Vaginal births with both of her children   No significant tearing or difficulty healing after births    PELVIC PAIN:     Pt reports intermittent lower abdominal/stomach pain; pt does feel like it could be related to IBS    Since onset, the symptoms are: worsening; some days are better than others   Pain when emptying bladder: No  Pain with wiping or tight clothing: irritation vs  pain   Pain with intercourse: Not specifically   History of back pain: No    EXERCISE:  Do you do Kegels? No   Current exercise regime: NA    BLADDER:     Excessive Urinary Urgency: No  Daytime Voiding Frequency: WNL  Nighttime Voiding Frequency: Occasionally will wake up in the middle of the nigh to void   Unintentional urine loss frequency: Rarely  Leakage occurs with: sneezing, coughing  Leakage amount: Small   Difficulty initiating flow of urine: No  Slow/weak urine stream: No  Difficulty starting urine stream/push to urinate: No  Able to completely empty bladder: Yes  Tests performed by doctor: No  Frequent UTI's: No  Frequent yeast infections: No     BOWEL:     Excessive Bowel Urgency: No  BM Frequency: Daily  Frequent Diarrhea: Yes  Frequent constipation/straining/incomplete emptying: Not specifically   [place] Stool Scale rating: Type 4 lately; previous to that was more Type 5-6    Unintentional stool loss: Don't necessarily see anything; will note smearing with wiping and irritation  Stool loss frequency: Daily; incomplete emptying (?)  FI occurs with what activity: N/A  FI amount: Small amount; if any   Consistency of stool when FI occurs: not specific; again will more notice smearing vs full leakage    Tests performed by doctor: No specific testing besides regular colonoscopies     Time spent with instructions, indications, and expectations of rectal exam. Verbal consent given.     EXTERNAL OBSERVATION:  Voluntary Contraction: Present   Voluntary Relaxation: present  Involuntary Contraction: present  Involuntary Relaxation: present   Small skin tag noted  Appropriate rectal PF mobility         INTERNAL RECTAL EXAMINATION:  PFM Strength: 4/5  Coordination: 5 in 10 seconds   Endurance: 5 second holds x 2 repetition      EXTERNAL PALPATION:  Levator Ani: Mild Pain/Tightness R, Mild Pain/Tightness L  Bulbo: No Pain/Tightness R, NO Pain/Tightness L  Ischiocavernosus: No Pain/Tightness R, No Pain/Tightness  L  Transverse perineum: No Pain/Tightness R, No Pain/Tightness L      OUTCOMES MEASURE:  Female NIH Chronic Prostatitis Symptom Index: 15    1. Pt will be independent in HEP to maximize PT POC   2. Pt will improve NIH CPSI by >50% raw score  3. Pt will be able to improve worst pain severity on NPRS by >2 points MCID   4. Pt will be able to improve fecal smearing by >50%      Careplan Goals:  Problem: PT Problem       Goal: Pt will be independent in HEP for home maintenance            Marycruz Johnson, PT

## 2024-06-21 ENCOUNTER — LAB (OUTPATIENT)
Dept: LAB | Facility: LAB | Age: 64
End: 2024-06-21
Payer: COMMERCIAL

## 2024-06-21 DIAGNOSIS — Z00.00 HEALTH MAINTENANCE EXAMINATION: ICD-10-CM

## 2024-06-21 LAB
ALBUMIN SERPL BCP-MCNC: 4.5 G/DL (ref 3.4–5)
ALP SERPL-CCNC: 63 U/L (ref 33–136)
ALT SERPL W P-5'-P-CCNC: 16 U/L (ref 7–45)
ANION GAP SERPL CALC-SCNC: 11 MMOL/L (ref 10–20)
AST SERPL W P-5'-P-CCNC: 16 U/L (ref 9–39)
BILIRUB SERPL-MCNC: 0.5 MG/DL (ref 0–1.2)
BUN SERPL-MCNC: 16 MG/DL (ref 6–23)
CALCIUM SERPL-MCNC: 9.9 MG/DL (ref 8.6–10.6)
CHLORIDE SERPL-SCNC: 106 MMOL/L (ref 98–107)
CHOLEST SERPL-MCNC: 201 MG/DL (ref 0–199)
CHOLESTEROL/HDL RATIO: 2.9
CO2 SERPL-SCNC: 27 MMOL/L (ref 21–32)
CREAT SERPL-MCNC: 0.79 MG/DL (ref 0.5–1.05)
EGFRCR SERPLBLD CKD-EPI 2021: 84 ML/MIN/1.73M*2
GLUCOSE SERPL-MCNC: 85 MG/DL (ref 74–99)
HDLC SERPL-MCNC: 68.3 MG/DL
LDLC SERPL CALC-MCNC: 119 MG/DL
NON HDL CHOLESTEROL: 133 MG/DL (ref 0–149)
POTASSIUM SERPL-SCNC: 4.4 MMOL/L (ref 3.5–5.3)
PROT SERPL-MCNC: 6.8 G/DL (ref 6.4–8.2)
SODIUM SERPL-SCNC: 140 MMOL/L (ref 136–145)
T4 FREE SERPL-MCNC: 1.05 NG/DL (ref 0.78–1.48)
TRIGL SERPL-MCNC: 70 MG/DL (ref 0–149)
TSH SERPL-ACNC: 8.44 MIU/L (ref 0.44–3.98)
VLDL: 14 MG/DL (ref 0–40)

## 2024-06-21 PROCEDURE — 84439 ASSAY OF FREE THYROXINE: CPT

## 2024-06-21 PROCEDURE — 80061 LIPID PANEL: CPT

## 2024-06-21 PROCEDURE — 36415 COLL VENOUS BLD VENIPUNCTURE: CPT

## 2024-06-21 PROCEDURE — 80053 COMPREHEN METABOLIC PANEL: CPT

## 2024-06-21 PROCEDURE — 84443 ASSAY THYROID STIM HORMONE: CPT

## 2024-06-25 DIAGNOSIS — R79.89 ABNORMAL TSH: Primary | ICD-10-CM

## 2024-06-26 ENCOUNTER — TREATMENT (OUTPATIENT)
Dept: PHYSICAL THERAPY | Facility: CLINIC | Age: 64
End: 2024-06-26
Payer: COMMERCIAL

## 2024-06-26 DIAGNOSIS — K59.9 BOWEL DYSFUNCTION: ICD-10-CM

## 2024-06-26 PROCEDURE — 97112 NEUROMUSCULAR REEDUCATION: CPT | Mod: GP

## 2024-06-26 NOTE — PROGRESS NOTES
PHYSICAL THERAPY   TREATMENT NOTE    Patient Name:  Morena Calle   Patient MRN: [id number]  Date: 06/26/24    Time Calculation  Start Time: 1325  Stop Time: 1403  Time Calculation (min): 38 min    Insurance:  Visit number: 4  Insurance Type: [employer]   Approved # of visits: 25  Authorization Needed: after 25  Cert Date Ends On: n/a    General   Reason for visit: Bowel dysfunction  Referred by: REGIS Castelan diagnoses:   1. Bowel dysfunction  Follow Up In Physical Therapy           Assessment:    Pt demonstrating fairly consistent biofeedback control, she struggles more with consistent low load long duration contractions. Function is improving. Anticipate this will get better over time with stool consistency changes and improved endurance of EAS.     Plan:  All patient's questions were answered and will discharge and follow up if needed.      Subjective  Pt reports that she feels symptoms are improving slowly. She still has a little bit of fecal soiling but there is less overall. She is compliant with rectal squeeze post BM.   Pain (0-10):     Precautions  PMH: stress incontinence  Fall Risk: no    Objective    Treatment Performed:   Therapeutic Exercise:    minutes    Self Care:   minutes    Manual Therapy:   minutes    Neuromuscular Re-education: 38 minutes  Time spent with instructions, indications, and expectations of biofeedback use, and goals of treatment. Time also spent with preparation, set up, and performance.   [id number]  [id number]  [id number]    Gait Training:    minutes    Modalities: minutes

## 2024-07-05 ENCOUNTER — APPOINTMENT (OUTPATIENT)
Dept: OBSTETRICS AND GYNECOLOGY | Facility: CLINIC | Age: 64
End: 2024-07-05
Payer: COMMERCIAL

## 2024-07-19 ENCOUNTER — APPOINTMENT (OUTPATIENT)
Dept: OBSTETRICS AND GYNECOLOGY | Facility: CLINIC | Age: 64
End: 2024-07-19
Payer: COMMERCIAL

## 2024-12-09 ENCOUNTER — CLINICAL SUPPORT (OUTPATIENT)
Dept: AUDIOLOGY | Facility: CLINIC | Age: 64
End: 2024-12-09
Payer: COMMERCIAL

## 2024-12-09 DIAGNOSIS — H90.3 SENSORINEURAL HEARING LOSS (SNHL) OF BOTH EARS: Primary | ICD-10-CM

## 2024-12-09 PROCEDURE — 92550 TYMPANOMETRY & REFLEX THRESH: CPT | Performed by: AUDIOLOGIST

## 2024-12-09 PROCEDURE — 92557 COMPREHENSIVE HEARING TEST: CPT | Performed by: AUDIOLOGIST

## 2024-12-09 NOTE — PROGRESS NOTES
Chief Complaint   Patient presents with    Hearing Loss      HISTORY:  Morena Calle, age 64 years, was seen for audiogram on 12/9/2024.  Ms. Calle presents with documented bilateral sensorineural hearing loss.  She was last tested through [facility] in 2016.  Ms. Calle reports a decrease in hearing.  She has been tested more recently through a private practice where she purchased hearing aids.  She states she has not been wearing these aids as they were in the ear products that broke several times in a row.  She purchased over the counter hearing aids but was not satisfied.  The [employer] products were upgraded at no charge when she attempted to return them.  The new products have nine volume settings and four listening modes.  Ms. Calle denies ear pain, ear pressure, middle ear pathology, ear drainage, ear surgery and tinnitus. She does report periodic ear popping.    RESULTS:  Prior to testing both external auditory canals were clear and tympanic membranes visualized    Immittance and acoustic reflexes:  Immittance testing yielded TYPE A tympanograms indicating normal middle ear function both ears  Acoustic reflexes were absent 500 - 4000 Hz both ears    Audiogram:  Moderate to moderate severe 125 - 8000 Hz both ears  Speech reception thresholds obtained at 50 dBHL right ear and 55 dBHL left ear  Speech discrimination scores were 100% right ear and 96% left ear at 80 dBHL  UCL for speech were obtained at 100 dBHL right ear and 95 dBHL left ear    IMPRESSIONS:  Normal middle ear function noted both ears  Bilateral moderate to moderate severe sensorineural hearing loss both ears    Ms. Calle would benefit from binaural hearing aids with custom ear molds.  She is encouraged to contact her insurance to determine hearing aid benefits and hearing aid providers.    RECOMMENDATIONS:  1.  Retest hearing levels annually  2.  Bilateral hearing aids with custom  ear molds      time: 8109 - 4518

## 2025-01-24 ENCOUNTER — TRANSCRIBE ORDERS (OUTPATIENT)
Dept: OBSTETRICS AND GYNECOLOGY | Facility: CLINIC | Age: 65
End: 2025-01-24
Payer: COMMERCIAL

## 2025-01-24 DIAGNOSIS — Z12.31 BREAST CANCER SCREENING BY MAMMOGRAM: Primary | ICD-10-CM

## 2025-02-18 ENCOUNTER — HOSPITAL ENCOUNTER (OUTPATIENT)
Dept: RADIOLOGY | Facility: CLINIC | Age: 65
Discharge: HOME | End: 2025-02-18
Payer: COMMERCIAL

## 2025-02-18 VITALS — WEIGHT: 162.92 LBS | BODY MASS INDEX: 25.57 KG/M2 | HEIGHT: 67 IN

## 2025-02-18 DIAGNOSIS — Z12.31 BREAST CANCER SCREENING BY MAMMOGRAM: ICD-10-CM

## 2025-02-18 PROCEDURE — 77063 BREAST TOMOSYNTHESIS BI: CPT | Performed by: RADIOLOGY

## 2025-02-18 PROCEDURE — 77067 SCR MAMMO BI INCL CAD: CPT | Performed by: RADIOLOGY

## 2025-02-18 PROCEDURE — 77067 SCR MAMMO BI INCL CAD: CPT

## 2025-04-11 ENCOUNTER — APPOINTMENT (OUTPATIENT)
Dept: OBSTETRICS AND GYNECOLOGY | Facility: CLINIC | Age: 65
End: 2025-04-11
Payer: COMMERCIAL

## 2025-04-24 ENCOUNTER — TELEPHONE (OUTPATIENT)
Dept: PRIMARY CARE | Facility: CLINIC | Age: 65
End: 2025-04-24
Payer: COMMERCIAL

## 2025-04-25 DIAGNOSIS — R79.89 ABNORMAL TSH: Primary | ICD-10-CM

## 2025-04-28 ENCOUNTER — APPOINTMENT (OUTPATIENT)
Dept: OBSTETRICS AND GYNECOLOGY | Facility: CLINIC | Age: 65
End: 2025-04-28
Payer: COMMERCIAL

## 2025-05-11 LAB
T4 FREE SERPL-MCNC: 1.2 NG/DL (ref 0.8–1.8)
TSH SERPL-ACNC: 7.21 MIU/L (ref 0.4–4.5)

## 2025-05-12 PROBLEM — K57.32 DIVERTICULITIS OF COLON: Status: ACTIVE | Noted: 2025-05-12

## 2025-05-12 PROBLEM — J02.9 SORE THROAT: Status: ACTIVE | Noted: 2025-05-12

## 2025-05-12 PROBLEM — R30.0 DYSURIA: Status: ACTIVE | Noted: 2025-05-12

## 2025-05-12 PROBLEM — N64.89 BREAST ASYMMETRY: Status: ACTIVE | Noted: 2025-05-12

## 2025-05-12 PROBLEM — E66.3 OVERWEIGHT: Status: ACTIVE | Noted: 2024-10-11

## 2025-05-12 PROBLEM — K57.90 DIVERTICULOSIS: Status: ACTIVE | Noted: 2025-05-12

## 2025-05-12 PROBLEM — M25.579 ARTHRALGIA OF ANKLE: Status: ACTIVE | Noted: 2025-05-12

## 2025-05-12 PROBLEM — J39.2 THROAT IRRITATION: Status: ACTIVE | Noted: 2025-05-12

## 2025-05-12 PROBLEM — R22.1 LOCALIZED SWELLING, MASS OR LUMP OF NECK: Status: ACTIVE | Noted: 2025-05-12

## 2025-05-12 PROBLEM — Z86.39 HISTORY OF HYPOTHYROIDISM: Status: ACTIVE | Noted: 2025-05-12

## 2025-05-12 PROBLEM — G62.9 PERIPHERAL NEUROPATHY: Status: ACTIVE | Noted: 2025-05-12

## 2025-05-12 PROBLEM — R19.5 LOOSE STOOLS: Status: ACTIVE | Noted: 2025-05-12

## 2025-05-12 PROBLEM — N83.201 CYST OF RIGHT OVARY: Status: ACTIVE | Noted: 2025-05-12

## 2025-05-12 PROBLEM — M25.9 DISORDER OF SHOULDER: Status: ACTIVE | Noted: 2024-10-11

## 2025-05-12 PROBLEM — R00.2 PALPITATIONS: Status: ACTIVE | Noted: 2025-05-12

## 2025-05-12 PROBLEM — J03.00 ACUTE STREPTOCOCCAL TONSILLITIS: Status: ACTIVE | Noted: 2025-05-12

## 2025-05-12 PROBLEM — N89.8 VAGINAL ODOR: Status: ACTIVE | Noted: 2025-05-12

## 2025-05-12 PROBLEM — R03.0 ELEVATED BLOOD PRESSURE READING: Status: ACTIVE | Noted: 2025-05-12

## 2025-05-12 PROBLEM — J31.0 CHRONIC RHINITIS: Status: ACTIVE | Noted: 2025-05-12

## 2025-05-12 PROBLEM — H90.3 SENSORINEURAL HEARING LOSS, BILATERAL: Status: ACTIVE | Noted: 2025-05-12

## 2025-05-12 PROBLEM — R10.30 LOWER ABDOMINAL PAIN: Status: ACTIVE | Noted: 2025-05-12

## 2025-05-12 PROBLEM — K59.00 CONSTIPATION: Status: ACTIVE | Noted: 2025-05-12

## 2025-05-12 PROBLEM — R21 RASH: Status: ACTIVE | Noted: 2025-05-12

## 2025-05-12 PROBLEM — I10 ESSENTIAL HYPERTENSION: Status: ACTIVE | Noted: 2024-10-11

## 2025-05-14 ENCOUNTER — APPOINTMENT (OUTPATIENT)
Dept: PRIMARY CARE | Facility: CLINIC | Age: 65
End: 2025-05-14
Payer: COMMERCIAL

## 2025-05-14 VITALS
DIASTOLIC BLOOD PRESSURE: 80 MMHG | HEIGHT: 67 IN | WEIGHT: 163.8 LBS | BODY MASS INDEX: 25.71 KG/M2 | TEMPERATURE: 97.7 F | OXYGEN SATURATION: 99 % | SYSTOLIC BLOOD PRESSURE: 136 MMHG | HEART RATE: 73 BPM

## 2025-05-14 DIAGNOSIS — Z00.00 HEALTH MAINTENANCE EXAMINATION: ICD-10-CM

## 2025-05-14 DIAGNOSIS — E03.9 HYPOTHYROIDISM, UNSPECIFIED TYPE: Primary | ICD-10-CM

## 2025-05-14 PROBLEM — R19.5 LOOSE STOOLS: Status: RESOLVED | Noted: 2025-05-12 | Resolved: 2025-05-14

## 2025-05-14 PROBLEM — R03.0 ELEVATED BLOOD PRESSURE READING: Status: RESOLVED | Noted: 2025-05-12 | Resolved: 2025-05-14

## 2025-05-14 PROBLEM — J02.9 SORE THROAT: Status: RESOLVED | Noted: 2025-05-12 | Resolved: 2025-05-14

## 2025-05-14 PROBLEM — R15.1 FECAL SOILING: Status: RESOLVED | Noted: 2024-03-12 | Resolved: 2025-05-14

## 2025-05-14 PROBLEM — E66.3 OVERWEIGHT: Status: RESOLVED | Noted: 2024-10-11 | Resolved: 2025-05-14

## 2025-05-14 PROBLEM — R00.2 PALPITATIONS: Status: RESOLVED | Noted: 2025-05-12 | Resolved: 2025-05-14

## 2025-05-14 PROBLEM — J03.00 ACUTE STREPTOCOCCAL TONSILLITIS: Status: RESOLVED | Noted: 2025-05-12 | Resolved: 2025-05-14

## 2025-05-14 PROBLEM — K57.32 DIVERTICULITIS OF COLON: Status: RESOLVED | Noted: 2025-05-12 | Resolved: 2025-05-14

## 2025-05-14 PROBLEM — J39.2 THROAT IRRITATION: Status: RESOLVED | Noted: 2025-05-12 | Resolved: 2025-05-14

## 2025-05-14 PROBLEM — M25.579 ARTHRALGIA OF ANKLE: Status: RESOLVED | Noted: 2025-05-12 | Resolved: 2025-05-14

## 2025-05-14 PROBLEM — N89.8 VAGINAL ODOR: Status: RESOLVED | Noted: 2025-05-12 | Resolved: 2025-05-14

## 2025-05-14 PROBLEM — R21 RASH: Status: RESOLVED | Noted: 2025-05-12 | Resolved: 2025-05-14

## 2025-05-14 PROBLEM — R10.30 LOWER ABDOMINAL PAIN: Status: RESOLVED | Noted: 2025-05-12 | Resolved: 2025-05-14

## 2025-05-14 PROBLEM — R30.0 DYSURIA: Status: RESOLVED | Noted: 2025-05-12 | Resolved: 2025-05-14

## 2025-05-14 PROBLEM — R82.90 ABNORMAL URINE ODOR: Status: RESOLVED | Noted: 2023-11-14 | Resolved: 2025-05-14

## 2025-05-14 PROCEDURE — 1036F TOBACCO NON-USER: CPT | Performed by: FAMILY MEDICINE

## 2025-05-14 PROCEDURE — 3008F BODY MASS INDEX DOCD: CPT | Performed by: FAMILY MEDICINE

## 2025-05-14 PROCEDURE — 3079F DIAST BP 80-89 MM HG: CPT | Performed by: FAMILY MEDICINE

## 2025-05-14 PROCEDURE — 3075F SYST BP GE 130 - 139MM HG: CPT | Performed by: FAMILY MEDICINE

## 2025-05-14 PROCEDURE — 99214 OFFICE O/P EST MOD 30 MIN: CPT | Performed by: FAMILY MEDICINE

## 2025-05-14 ASSESSMENT — ENCOUNTER SYMPTOMS
OCCASIONAL FEELINGS OF UNSTEADINESS: 0
LOSS OF SENSATION IN FEET: 0
DEPRESSION: 0

## 2025-05-14 ASSESSMENT — PATIENT HEALTH QUESTIONNAIRE - PHQ9
2. FEELING DOWN, DEPRESSED OR HOPELESS: NOT AT ALL
1. LITTLE INTEREST OR PLEASURE IN DOING THINGS: NOT AT ALL
SUM OF ALL RESPONSES TO PHQ9 QUESTIONS 1 AND 2: 0

## 2025-05-14 NOTE — PROGRESS NOTES
"Subjective   Patient ID: Morena Calle is a 64 y.o. female who presents for Follow-up and Hypothyroidism.    HPI   Presents for hypothyroidism follow up. Generally asymptomatic.  Has had abnormal labs for a long time but tend to fluctuate. Never above 10.  Has never been on medication.  Does not think she has been tested for Hashimoto's .  Review of Systems  Negative unless noted in HPI    Objective   /80 (BP Location: Left arm, Patient Position: Sitting, BP Cuff Size: Adult)   Pulse 73   Temp 36.5 °C (97.7 °F) (Temporal)   Ht 1.702 m (5' 7\")   Wt 74.3 kg (163 lb 12.8 oz)   SpO2 99%   BMI 25.65 kg/m²     Physical Exam  Constitutional:       Appearance: Normal appearance.   Cardiovascular:      Rate and Rhythm: Normal rate and regular rhythm.   Pulmonary:      Effort: Pulmonary effort is normal.      Breath sounds: Normal breath sounds.   Neurological:      Mental Status: She is alert.   Psychiatric:         Mood and Affect: Mood normal.         Assessment/Plan   Problem List Items Addressed This Visit           ICD-10-CM    Hypothyroidism - Primary E03.9    TSH stable and under 10 with pt fairly asymptomatic  We discussed recommendations going forward  Will have pt obtain additional labs to evaluate for autoimmune cause, recheck in 6 months along with additional labs  Follow up at that time or earlier if any issues               "

## 2025-05-14 NOTE — ASSESSMENT & PLAN NOTE
TSH stable and under 10 with pt fairly asymptomatic  We discussed recommendations going forward  Will have pt obtain additional labs to evaluate for autoimmune cause, recheck in 6 months along with additional labs  Follow up at that time or earlier if any issues

## 2025-05-15 ENCOUNTER — APPOINTMENT (OUTPATIENT)
Facility: CLINIC | Age: 65
End: 2025-05-15
Payer: COMMERCIAL

## 2025-05-15 VITALS
WEIGHT: 162 LBS | DIASTOLIC BLOOD PRESSURE: 80 MMHG | SYSTOLIC BLOOD PRESSURE: 129 MMHG | BODY MASS INDEX: 25.43 KG/M2 | HEIGHT: 67 IN

## 2025-05-15 DIAGNOSIS — Z01.419 ENCOUNTER FOR ANNUAL ROUTINE GYNECOLOGICAL EXAMINATION: Primary | ICD-10-CM

## 2025-05-15 PROCEDURE — 1036F TOBACCO NON-USER: CPT | Performed by: OBSTETRICS & GYNECOLOGY

## 2025-05-15 PROCEDURE — 3074F SYST BP LT 130 MM HG: CPT | Performed by: OBSTETRICS & GYNECOLOGY

## 2025-05-15 PROCEDURE — 99396 PREV VISIT EST AGE 40-64: CPT | Performed by: OBSTETRICS & GYNECOLOGY

## 2025-05-15 PROCEDURE — 3008F BODY MASS INDEX DOCD: CPT | Performed by: OBSTETRICS & GYNECOLOGY

## 2025-05-15 PROCEDURE — 3079F DIAST BP 80-89 MM HG: CPT | Performed by: OBSTETRICS & GYNECOLOGY

## 2025-05-15 ASSESSMENT — ENCOUNTER SYMPTOMS
SHORTNESS OF BREATH: 0
LOSS OF SENSATION IN FEET: 0
FEVER: 0
CONSTIPATION: 0
FATIGUE: 0
ABDOMINAL PAIN: 0
CHILLS: 0
UNEXPECTED WEIGHT CHANGE: 0
VOMITING: 0
FREQUENCY: 0
FLANK PAIN: 0
ABDOMINAL DISTENTION: 0
SLEEP DISTURBANCE: 0
OCCASIONAL FEELINGS OF UNSTEADINESS: 0
BLOOD IN STOOL: 0
NAUSEA: 0
APPETITE CHANGE: 0
DEPRESSION: 0
HEMATURIA: 0
COLOR CHANGE: 0
BACK PAIN: 0
DYSURIA: 0
DIARRHEA: 0

## 2025-05-15 ASSESSMENT — PATIENT HEALTH QUESTIONNAIRE - PHQ9
1. LITTLE INTEREST OR PLEASURE IN DOING THINGS: NOT AT ALL
SUM OF ALL RESPONSES TO PHQ9 QUESTIONS 1 AND 2: 0
2. FEELING DOWN, DEPRESSED OR HOPELESS: NOT AT ALL

## 2025-05-15 ASSESSMENT — COLUMBIA-SUICIDE SEVERITY RATING SCALE - C-SSRS
6. HAVE YOU EVER DONE ANYTHING, STARTED TO DO ANYTHING, OR PREPARED TO DO ANYTHING TO END YOUR LIFE?: NO
2. HAVE YOU ACTUALLY HAD ANY THOUGHTS OF KILLING YOURSELF?: NO
1. IN THE PAST MONTH, HAVE YOU WISHED YOU WERE DEAD OR WISHED YOU COULD GO TO SLEEP AND NOT WAKE UP?: NO

## 2025-05-15 NOTE — PROGRESS NOTES
"History Of Present Illness  Routine Gyn Exam  Morena Alva here for routine WWE.  Pt is postmenopausal.  Denies spotting or bleeding.     Concerns: none.     Medical and surgical histories reviewed with patient.   Exercise: irregular .     Gynecologic History  Postmenopausal.  Sexually active: Yes with one male partner/ .  Last Pap: .   Last mammogram: .   Last Colonoscopy:  up to date per patient .       Obstetric History  OB History    Para Term  AB Living   4 2 2  2 2   SAB IAB Ectopic Multiple Live Births       1      # Outcome Date GA Lbr Jef/2nd Weight Sex Type Anes PTL Lv   4 Term     M Vag-Spont   CATHY   3 Term     F Vag-Spont      2 AB            1 AB                 Review of Systems   Constitutional:  Negative for appetite change, chills, fatigue, fever and unexpected weight change.   Respiratory:  Negative for shortness of breath.    Cardiovascular:  Negative for chest pain.   Gastrointestinal:  Negative for abdominal distention, abdominal pain, blood in stool, constipation, diarrhea, nausea and vomiting.   Endocrine: Negative for cold intolerance and heat intolerance.   Genitourinary:  Negative for dyspareunia, dysuria, flank pain, frequency, genital sores, hematuria, menstrual problem, pelvic pain, urgency, vaginal bleeding, vaginal discharge and vaginal pain.   Musculoskeletal:  Negative for back pain.   Skin:  Negative for color change.   Psychiatric/Behavioral:  Negative for sleep disturbance.        /80   Ht 1.702 m (5' 7\")   Wt 73.5 kg (162 lb)   BMI 25.37 kg/m²      Physical Exam  Constitutional:       Appearance: Normal appearance.   HENT:      Head: Normocephalic and atraumatic.   Chest:   Breasts:     Right: Normal.      Left: Normal.   Abdominal:      General: Abdomen is flat.      Palpations: Abdomen is soft.      Tenderness: There is no abdominal tenderness.   Genitourinary:     General: Normal vulva.      Vagina: Normal.      Cervix: " Normal.      Uterus: Normal.       Adnexa: Right adnexa normal and left adnexa normal.   Skin:     General: Skin is warm and dry.   Neurological:      Mental Status: She is alert and oriented to person, place, and time.   Psychiatric:         Mood and Affect: Mood normal.              Assessment/Plan         Routine Well Woman Exam Today  Discussed diet and exercise.   Reviewed routine health screenings.   Pap: 2024 wnl   Recommend annual mammograms. Up to date.   Currently up to date on colon cancer screening.                  Jaylin Acosta MD